# Patient Record
Sex: MALE | Race: AMERICAN INDIAN OR ALASKA NATIVE
[De-identification: names, ages, dates, MRNs, and addresses within clinical notes are randomized per-mention and may not be internally consistent; named-entity substitution may affect disease eponyms.]

---

## 2019-07-06 ENCOUNTER — HOSPITAL ENCOUNTER (EMERGENCY)
Dept: HOSPITAL 5 - ED | Age: 49
Discharge: OTHER INSTITUTION | End: 2019-07-06
Payer: COMMERCIAL

## 2019-07-06 VITALS — SYSTOLIC BLOOD PRESSURE: 116 MMHG | DIASTOLIC BLOOD PRESSURE: 71 MMHG

## 2019-07-06 DIAGNOSIS — D72.829: ICD-10-CM

## 2019-07-06 DIAGNOSIS — N20.0: Primary | ICD-10-CM

## 2019-07-06 LAB
ALBUMIN SERPL-MCNC: 4.5 G/DL (ref 3.9–5)
ALT SERPL-CCNC: 28 UNITS/L (ref 7–56)
BACTERIA #/AREA URNS HPF: (no result) /HPF
BASOPHILS # (AUTO): 0 K/MM3 (ref 0–0.1)
BASOPHILS NFR BLD AUTO: 0.2 % (ref 0–1.8)
BILIRUB UR QL STRIP: (no result)
BLOOD UR QL VISUAL: (no result)
BUN SERPL-MCNC: 26 MG/DL (ref 9–20)
BUN/CREAT SERPL: 14 %
CALCIUM SERPL-MCNC: 9.5 MG/DL (ref 8.4–10.2)
EOSINOPHIL # BLD AUTO: 0 K/MM3 (ref 0–0.4)
EOSINOPHIL NFR BLD AUTO: 0.1 % (ref 0–4.3)
HCT VFR BLD CALC: 44 % (ref 35.5–45.6)
HEMOLYSIS INDEX: 18
HGB BLD-MCNC: 14.8 GM/DL (ref 11.8–15.2)
LYMPHOCYTES # BLD AUTO: 1.5 K/MM3 (ref 1.2–5.4)
LYMPHOCYTES NFR BLD AUTO: 7.9 % (ref 13.4–35)
MCHC RBC AUTO-ENTMCNC: 34 % (ref 32–34)
MCV RBC AUTO: 91 FL (ref 84–94)
MONOCYTES # (AUTO): 1.7 K/MM3 (ref 0–0.8)
MONOCYTES % (AUTO): 8.7 % (ref 0–7.3)
MUCOUS THREADS #/AREA URNS HPF: (no result) /HPF
PH UR STRIP: 6 [PH] (ref 5–7)
PLATELET # BLD: 251 K/MM3 (ref 140–440)
PROT UR STRIP-MCNC: (no result) MG/DL
RBC # BLD AUTO: 4.81 M/MM3 (ref 3.65–5.03)
RBC #/AREA URNS HPF: 2 /HPF (ref 0–6)
UROBILINOGEN UR-MCNC: < 2 MG/DL (ref ?–2)
WBC #/AREA URNS HPF: < 1 /HPF (ref 0–6)

## 2019-07-06 PROCEDURE — 74176 CT ABD & PELVIS W/O CONTRAST: CPT

## 2019-07-06 PROCEDURE — 96375 TX/PRO/DX INJ NEW DRUG ADDON: CPT

## 2019-07-06 PROCEDURE — 85025 COMPLETE CBC W/AUTO DIFF WBC: CPT

## 2019-07-06 PROCEDURE — 83690 ASSAY OF LIPASE: CPT

## 2019-07-06 PROCEDURE — 99284 EMERGENCY DEPT VISIT MOD MDM: CPT

## 2019-07-06 PROCEDURE — 80053 COMPREHEN METABOLIC PANEL: CPT

## 2019-07-06 PROCEDURE — 36415 COLL VENOUS BLD VENIPUNCTURE: CPT

## 2019-07-06 PROCEDURE — 96374 THER/PROPH/DIAG INJ IV PUSH: CPT

## 2019-07-06 PROCEDURE — 81001 URINALYSIS AUTO W/SCOPE: CPT

## 2019-07-06 PROCEDURE — 96361 HYDRATE IV INFUSION ADD-ON: CPT

## 2019-07-06 PROCEDURE — 96376 TX/PRO/DX INJ SAME DRUG ADON: CPT

## 2019-07-06 NOTE — EMERGENCY DEPARTMENT REPORT
ED General Adult HPI





- General


Chief complaint: Abdominal Pain


Stated complaint: ABD PAIN


Time Seen by Provider: 07/06/19 10:04


Source: patient


Mode of arrival: Ambulatory


Limitations: No Limitations





- History of Present Illness


Initial comments: 


Patient is a 48-year-old male no significant past medical history who presents 

with severe epigastric abdominal pain it's been going on for last 2 days.  He's 

also been having nausea and vomiting for last 2 days.  Patient states that the 

pain was sudden in his epigastric area that does not radiate nothing makes it 

better and nothing makes it worse.  The pain is constant patient denies having 

any chest pain or any fever or chills.








- Related Data


                                    Allergies











Allergy/AdvReac Type Severity Reaction Status Date / Time


 


No Known Allergies Allergy   Unverified 07/06/19 09:47














ED Review of Systems


ROS: 


Stated complaint: ABD PAIN


Other details as noted in HPI





Constitutional: denies: chills, fever


Eyes: denies: eye pain, eye discharge, vision change


ENT: denies: ear pain, throat pain


Respiratory: denies: cough, shortness of breath, wheezing


Cardiovascular: denies: chest pain, palpitations


Endocrine: no symptoms reported


Gastrointestinal: abdominal pain, nausea, vomiting.  denies: diarrhea


Genitourinary: denies: urgency, dysuria


Musculoskeletal: denies: back pain, joint swelling, arthralgia


Skin: denies: rash, lesions


Neurological: denies: headache, weakness, paresthesias


Psychiatric: denies: anxiety, depression


Hematological/Lymphatic: denies: easy bleeding, easy bruising





ED Past Medical Hx





- Past Medical History


Previous Medical History?: No





- Surgical History


Past Surgical History?: No





- Social History


Smoking Status: Former Smoker


Substance Use Type: Alcohol





ED Physical Exam





- General


Limitations: No Limitations


General appearance: alert, in no apparent distress





- Head


Head exam: Present: atraumatic, normocephalic





- Eye


Eye exam: Present: normal appearance





- ENT


ENT exam: Present: mucous membranes moist





- Neck


Neck exam: Present: normal inspection





- Respiratory


Respiratory exam: Present: normal lung sounds bilaterally.  Absent: respiratory 

distress





- Cardiovascular


Cardiovascular Exam: Present: regular rate, normal rhythm.  Absent: systolic 

murmur, diastolic murmur, rubs, gallop





- GI/Abdominal


GI/Abdominal exam: Present: soft, tenderness, normal bowel sounds





- Rectal


Rectal exam: Present: deferred





- Extremities Exam


Extremities exam: Present: normal inspection





- Back Exam


Back exam: Present: normal inspection





- Neurological Exam


Neurological exam: Present: alert, oriented X3





- Psychiatric


Psychiatric exam: Present: normal affect, normal mood





- Skin


Skin exam: Present: warm, dry, intact, normal color.  Absent: rash





ED Course


                                   Vital Signs











  07/06/19 07/06/19





  09:47 12:32


 


Temperature 98.1 F 


 


Pulse Rate 103 H 73


 


Respiratory 20 16





Rate  


 


Blood Pressure 145/92 


 


Blood Pressure  120/75





[Left]  


 


O2 Sat by Pulse 97 97





Oximetry  














- Consultations


Consultation #1: 





07/06/19 13:53


Consulted Urologist Dr. Saenz for transfer for patient's 6mm kidney stone. 





ED Medical Decision Making





- Lab Data


Result diagrams: 


                                 07/06/19 09:54





                                 07/06/19 09:54








                                   Lab Results











  07/06/19 07/06/19 07/06/19 Range/Units





  09:54 09:54 12:28 


 


WBC  19.0 H    (4.5-11.0)  K/mm3


 


RBC  4.81    (3.65-5.03)  M/mm3


 


Hgb  14.8    (11.8-15.2)  gm/dl


 


Hct  44.0    (35.5-45.6)  %


 


MCV  91    (84-94)  fl


 


MCH  31    (28-32)  pg


 


MCHC  34    (32-34)  %


 


RDW  13.2    (13.2-15.2)  %


 


Plt Count  251    (140-440)  K/mm3


 


Lymph % (Auto)  7.9 L    (13.4-35.0)  %


 


Mono % (Auto)  8.7 H    (0.0-7.3)  %


 


Eos % (Auto)  0.1    (0.0-4.3)  %


 


Baso % (Auto)  0.2    (0.0-1.8)  %


 


Lymph #  1.5    (1.2-5.4)  K/mm3


 


Mono #  1.7 H    (0.0-0.8)  K/mm3


 


Eos #  0.0    (0.0-0.4)  K/mm3


 


Baso #  0.0    (0.0-0.1)  K/mm3


 


Seg Neutrophils %  83.1 H    (40.0-70.0)  %


 


Seg Neutrophils #  15.8 H    (1.8-7.7)  K/mm3


 


Sodium   139   (137-145)  mmol/L


 


Potassium   4.1   (3.6-5.0)  mmol/L


 


Chloride   99.9   ()  mmol/L


 


Carbon Dioxide   22   (22-30)  mmol/L


 


Anion Gap   21   mmol/L


 


BUN   26 H   (9-20)  mg/dL


 


Creatinine   1.9 H   (0.8-1.5)  mg/dL


 


Estimated GFR   38   ml/min


 


BUN/Creatinine Ratio   14   %


 


Glucose   128 H   ()  mg/dL


 


Calcium   9.5   (8.4-10.2)  mg/dL


 


Total Bilirubin   0.90   (0.1-1.2)  mg/dL


 


AST   24   (5-40)  units/L


 


ALT   28   (7-56)  units/L


 


Alkaline Phosphatase   68   ()  units/L


 


Total Protein   8.2   (6.3-8.2)  g/dL


 


Albumin   4.5   (3.9-5)  g/dL


 


Albumin/Globulin Ratio   1.2   %


 


Lipase   18   (13-60)  units/L


 


Urine Color    Yellow  (Yellow)  


 


Urine Turbidity    Clear  (Clear)  


 


Urine pH    6.0  (5.0-7.0)  


 


Ur Specific Gravity    1.016  (1.003-1.030)  


 


Urine Protein    <15 mg/dl  (Negative)  mg/dL


 


Urine Glucose (UA)    Neg  (Negative)  mg/dL


 


Urine Ketones    20  (Negative)  mg/dL


 


Urine Blood    Sm  (Negative)  


 


Urine Nitrite    Neg  (Negative)  


 


Urine Bilirubin    Neg  (Negative)  


 


Urine Urobilinogen    < 2.0  (<2.0)  mg/dL


 


Ur Leukocyte Esterase    Neg  (Negative)  


 


Urine WBC (Auto)    < 1.0  (0.0-6.0)  /HPF


 


Urine RBC (Auto)    2.0  (0.0-6.0)  /HPF


 


U Epithel Cells (Auto)    < 1.0  (0-13.0)  /HPF


 


Urine Bacteria (Auto)    1+  (Negative)  /HPF


 


Urine Mucus    Few  /HPF














- Radiology Data


Radiology results: report reviewed, image reviewed





CT abdomen and pelvis: Shows left kidney stone 6 mm with mild hydronephrosis of 

the left kidney.





- Medical Decision Making


Cdx: Kidney stone


ddx: Bowel perforation, appendicitis, diverticulitis





I will get ct scan, cbc, bmp, lipase, IV pain medication and IV antiemetics








DT scan shows 6 mm kidney stone patient's pain is not controlled with IV and 

oral antiemetics I will transfer patient to [facility] due to no urologist 

being on call for [facility].





Discussed with patient they agree with plan for transfer. 





Critical care attestation.: 


If time is entered above; I have spent that time in minutes in the direct care 

of this critically ill patient, excluding procedure time.








ED Disposition


Clinical Impression: 


 Left renal stone, Left flank pain





Leukocytosis


Qualifiers:


 Leukocytosis type: unspecified Qualified Code(s): D72.829 - Elevated white 

blood cell count, unspecified





Disposition: DC/TX-70 ANOTHER TYPE HLTHCARE


Is pt being admited?: No


Does the pt Need Aspirin: No


Condition: Stable


Referrals: 


[facility], [place] [Primary Care Provider] - 3-5 Days

## 2019-07-06 NOTE — CAT SCAN REPORT
CT ABDOMEN AND PELVIS WITHOUT CONTRAST



HISTORY: Acute severe epigastric pain.



COMPARISON: None.



TECHNIQUE: CT images of the abdomen and pelvis were obtained without administration of intravenous co
ntrast.  All CT scans at this location are performed using CT dose reduction for ALARA by means of au
tomated exposure control. 



FINDINGS:



Lungs/bones:  There is minimal bibasilar atelectasis. No acute osseous abnormality or significant deg
enerative change identified.



Abdomen/pelvis:  The liver, gallbladder, spleen, pancreas, adrenals, and proximal GI tract appear unr
emarkable.



There is a 6 mm stone in the proximal left ureter with mild left-sided hydronephrosis and perinephric
 stranding the right kidney and collecting system appear normal.



The prostate is slightly enlarged and indents the bladder base. The bladder otherwise appears unremar
kable. No pelvic free fluid and no acute colonic abnormality identified. The appendix and terminal il
eum appear normal.



IMPRESSION:

1. 6 mm proximal left ureteral stone with mild hydronephrosis.



Signer Name: Jose J Boudreaux MD 

Signed: 7/6/2019 12:14 PM

 Workstation Name: [id number]